# Patient Record
Sex: FEMALE | Race: WHITE | NOT HISPANIC OR LATINO | Employment: STUDENT | URBAN - NONMETROPOLITAN AREA
[De-identification: names, ages, dates, MRNs, and addresses within clinical notes are randomized per-mention and may not be internally consistent; named-entity substitution may affect disease eponyms.]

---

## 2019-04-19 ENCOUNTER — HOSPITAL ENCOUNTER (OUTPATIENT)
Dept: GENERAL RADIOLOGY | Facility: HOSPITAL | Age: 24
Discharge: HOME | End: 2019-04-19
Admitting: PEDIATRICS

## 2019-04-19 ENCOUNTER — TRANSCRIBE ORDERS (OUTPATIENT)
Dept: ADMINISTRATIVE | Facility: HOSPITAL | Age: 24
End: 2019-04-19

## 2019-04-19 DIAGNOSIS — M77.8 RIGHT SHOULDER TENDINITIS: Primary | ICD-10-CM

## 2019-04-19 DIAGNOSIS — M77.8 RIGHT SHOULDER TENDINITIS: ICD-10-CM

## 2019-04-19 PROCEDURE — 73030 X-RAY EXAM OF SHOULDER: CPT | Performed by: RADIOLOGY

## 2019-04-19 PROCEDURE — 73030 X-RAY EXAM OF SHOULDER: CPT

## 2020-02-11 ENCOUNTER — HOSPITAL ENCOUNTER (EMERGENCY)
Facility: HOSPITAL | Age: 25
Discharge: HOME | End: 2020-02-11
Attending: EMERGENCY MEDICINE | Admitting: EMERGENCY MEDICINE

## 2020-02-11 ENCOUNTER — APPOINTMENT (OUTPATIENT)
Dept: GENERAL RADIOLOGY | Facility: HOSPITAL | Age: 25
End: 2020-02-11

## 2020-02-11 VITALS
RESPIRATION RATE: 19 BRPM | HEART RATE: 67 BPM | DIASTOLIC BLOOD PRESSURE: 72 MMHG | SYSTOLIC BLOOD PRESSURE: 116 MMHG | OXYGEN SATURATION: 99 % | BODY MASS INDEX: 22.66 KG/M2 | TEMPERATURE: 97.5 F | WEIGHT: 120 LBS | HEIGHT: 61 IN

## 2020-02-11 DIAGNOSIS — R10.9 ABDOMINAL PAIN, UNSPECIFIED ABDOMINAL LOCATION: Primary | ICD-10-CM

## 2020-02-11 LAB
ALBUMIN SERPL-MCNC: 4.75 G/DL (ref 3.5–5.2)
ALBUMIN/GLOB SERPL: 1.8 G/DL
ALP SERPL-CCNC: 65 U/L (ref 39–117)
ALT SERPL W P-5'-P-CCNC: 15 U/L (ref 1–33)
ANION GAP SERPL CALCULATED.3IONS-SCNC: 12 MMOL/L (ref 5–15)
AST SERPL-CCNC: 19 U/L (ref 1–32)
B-HCG UR QL: NEGATIVE
BACTERIA UR QL AUTO: ABNORMAL /HPF
BASOPHILS # BLD AUTO: 0.06 10*3/MM3 (ref 0–0.2)
BASOPHILS NFR BLD AUTO: 0.8 % (ref 0–1.5)
BILIRUB SERPL-MCNC: 0.9 MG/DL (ref 0.2–1.2)
BILIRUB UR QL STRIP: NEGATIVE
BUN BLD-MCNC: 10 MG/DL (ref 6–20)
BUN/CREAT SERPL: 15.9 (ref 7–25)
CALCIUM SPEC-SCNC: 9.5 MG/DL (ref 8.6–10.5)
CHLORIDE SERPL-SCNC: 102 MMOL/L (ref 98–107)
CLARITY UR: ABNORMAL
CO2 SERPL-SCNC: 24 MMOL/L (ref 22–29)
COLOR UR: YELLOW
CREAT BLD-MCNC: 0.63 MG/DL (ref 0.57–1)
DEPRECATED RDW RBC AUTO: 40.6 FL (ref 37–54)
EOSINOPHIL # BLD AUTO: 0.13 10*3/MM3 (ref 0–0.4)
EOSINOPHIL NFR BLD AUTO: 1.7 % (ref 0.3–6.2)
ERYTHROCYTE [DISTWIDTH] IN BLOOD BY AUTOMATED COUNT: 12.2 % (ref 12.3–15.4)
FLUAV AG NPH QL: NEGATIVE
FLUBV AG NPH QL IA: NEGATIVE
GFR SERPL CREATININE-BSD FRML MDRD: 118 ML/MIN/1.73
GLOBULIN UR ELPH-MCNC: 2.7 GM/DL
GLUCOSE BLD-MCNC: 100 MG/DL (ref 65–99)
GLUCOSE UR STRIP-MCNC: NEGATIVE MG/DL
HCT VFR BLD AUTO: 43.6 % (ref 34–46.6)
HGB BLD-MCNC: 13.7 G/DL (ref 12–15.9)
HGB UR QL STRIP.AUTO: NEGATIVE
HYALINE CASTS UR QL AUTO: ABNORMAL /LPF
IMM GRANULOCYTES # BLD AUTO: 0.01 10*3/MM3 (ref 0–0.05)
IMM GRANULOCYTES NFR BLD AUTO: 0.1 % (ref 0–0.5)
KETONES UR QL STRIP: NEGATIVE
LEUKOCYTE ESTERASE UR QL STRIP.AUTO: ABNORMAL
LIPASE SERPL-CCNC: 22 U/L (ref 13–60)
LYMPHOCYTES # BLD AUTO: 2.63 10*3/MM3 (ref 0.7–3.1)
LYMPHOCYTES NFR BLD AUTO: 34.8 % (ref 19.6–45.3)
MCH RBC QN AUTO: 28.7 PG (ref 26.6–33)
MCHC RBC AUTO-ENTMCNC: 31.4 G/DL (ref 31.5–35.7)
MCV RBC AUTO: 91.2 FL (ref 79–97)
MONOCYTES # BLD AUTO: 0.57 10*3/MM3 (ref 0.1–0.9)
MONOCYTES NFR BLD AUTO: 7.5 % (ref 5–12)
NEUTROPHILS # BLD AUTO: 4.16 10*3/MM3 (ref 1.7–7)
NEUTROPHILS NFR BLD AUTO: 55.1 % (ref 42.7–76)
NITRITE UR QL STRIP: NEGATIVE
NRBC BLD AUTO-RTO: 0 /100 WBC (ref 0–0.2)
PH UR STRIP.AUTO: 8.5 [PH] (ref 5–8)
PLATELET # BLD AUTO: 302 10*3/MM3 (ref 140–450)
PMV BLD AUTO: 9.9 FL (ref 6–12)
POTASSIUM BLD-SCNC: 3.9 MMOL/L (ref 3.5–5.2)
PROT SERPL-MCNC: 7.4 G/DL (ref 6–8.5)
PROT UR QL STRIP: NEGATIVE
RBC # BLD AUTO: 4.78 10*6/MM3 (ref 3.77–5.28)
RBC # UR: ABNORMAL /HPF
REF LAB TEST METHOD: ABNORMAL
SODIUM BLD-SCNC: 138 MMOL/L (ref 136–145)
SP GR UR STRIP: >1.03 (ref 1–1.03)
SQUAMOUS #/AREA URNS HPF: ABNORMAL /HPF
UROBILINOGEN UR QL STRIP: ABNORMAL
WBC NRBC COR # BLD: 7.56 10*3/MM3 (ref 3.4–10.8)
WBC UR QL AUTO: ABNORMAL /HPF
YEAST URNS QL MICRO: ABNORMAL /HPF

## 2020-02-11 PROCEDURE — 96375 TX/PRO/DX INJ NEW DRUG ADDON: CPT

## 2020-02-11 PROCEDURE — 81025 URINE PREGNANCY TEST: CPT | Performed by: NURSE PRACTITIONER

## 2020-02-11 PROCEDURE — 81001 URINALYSIS AUTO W/SCOPE: CPT | Performed by: NURSE PRACTITIONER

## 2020-02-11 PROCEDURE — 87804 INFLUENZA ASSAY W/OPTIC: CPT | Performed by: NURSE PRACTITIONER

## 2020-02-11 PROCEDURE — 74022 RADEX COMPL AQT ABD SERIES: CPT | Performed by: RADIOLOGY

## 2020-02-11 PROCEDURE — 96361 HYDRATE IV INFUSION ADD-ON: CPT

## 2020-02-11 PROCEDURE — 99284 EMERGENCY DEPT VISIT MOD MDM: CPT

## 2020-02-11 PROCEDURE — 74022 RADEX COMPL AQT ABD SERIES: CPT

## 2020-02-11 PROCEDURE — 85025 COMPLETE CBC W/AUTO DIFF WBC: CPT | Performed by: NURSE PRACTITIONER

## 2020-02-11 PROCEDURE — 25010000002 ONDANSETRON PER 1 MG: Performed by: NURSE PRACTITIONER

## 2020-02-11 PROCEDURE — 96374 THER/PROPH/DIAG INJ IV PUSH: CPT

## 2020-02-11 PROCEDURE — 80053 COMPREHEN METABOLIC PANEL: CPT | Performed by: NURSE PRACTITIONER

## 2020-02-11 PROCEDURE — 25010000002 KETOROLAC TROMETHAMINE PER 15 MG: Performed by: NURSE PRACTITIONER

## 2020-02-11 PROCEDURE — 83690 ASSAY OF LIPASE: CPT | Performed by: NURSE PRACTITIONER

## 2020-02-11 RX ORDER — ONDANSETRON 4 MG/1
4 TABLET, ORALLY DISINTEGRATING ORAL 4 TIMES DAILY PRN
Qty: 12 TABLET | Refills: 0 | Status: SHIPPED | OUTPATIENT
Start: 2020-02-11

## 2020-02-11 RX ORDER — ONDANSETRON 2 MG/ML
4 INJECTION INTRAMUSCULAR; INTRAVENOUS ONCE
Status: COMPLETED | OUTPATIENT
Start: 2020-02-11 | End: 2020-02-11

## 2020-02-11 RX ORDER — KETOROLAC TROMETHAMINE 30 MG/ML
30 INJECTION, SOLUTION INTRAMUSCULAR; INTRAVENOUS ONCE
Status: COMPLETED | OUTPATIENT
Start: 2020-02-11 | End: 2020-02-11

## 2020-02-11 RX ORDER — KETOROLAC TROMETHAMINE 10 MG/1
10 TABLET, FILM COATED ORAL EVERY 6 HOURS PRN
Qty: 12 TABLET | Refills: 0 | Status: SHIPPED | OUTPATIENT
Start: 2020-02-11

## 2020-02-11 RX ADMIN — SODIUM CHLORIDE 1000 ML: 9 INJECTION, SOLUTION INTRAVENOUS at 14:06

## 2020-02-11 RX ADMIN — KETOROLAC TROMETHAMINE 30 MG: 30 INJECTION, SOLUTION INTRAMUSCULAR; INTRAVENOUS at 14:07

## 2020-02-11 RX ADMIN — ONDANSETRON 4 MG: 2 INJECTION INTRAMUSCULAR; INTRAVENOUS at 14:07

## 2020-02-11 NOTE — ED PROVIDER NOTES
Subjective     History provided by:  Patient   used: No    Abdominal Pain   Pain location:  Generalized  Pain quality: cramping    Pain radiates to:  Does not radiate  Pain severity:  Mild  Onset quality:  Gradual  Timing:  Constant  Progression:  Worsening  Chronicity:  New  Context: not alcohol use, not awakening from sleep, not diet changes, not eating, not laxative use, not medication withdrawal, not previous surgeries, not recent illness, not retching and not suspicious food intake    Relieved by:  Nothing  Worsened by:  Nothing  Ineffective treatments:  None tried  Associated symptoms: nausea    Associated symptoms: no anorexia, no belching, no chest pain, no chills, no constipation, no diarrhea, no dysuria, no fatigue, no fever, no hematemesis, no shortness of breath, no vaginal bleeding and no vaginal discharge        Review of Systems   Constitutional: Negative for chills, fatigue and fever.   Respiratory: Negative for shortness of breath.    Cardiovascular: Negative for chest pain.   Gastrointestinal: Positive for abdominal pain and nausea. Negative for anorexia, constipation, diarrhea and hematemesis.   Genitourinary: Negative for dysuria, vaginal bleeding and vaginal discharge.       No past medical history on file.    Allergies   Allergen Reactions   • Penicillins        No past surgical history on file.    No family history on file.    Social History     Socioeconomic History   • Marital status: Single     Spouse name: Not on file   • Number of children: Not on file   • Years of education: Not on file   • Highest education level: Not on file   Tobacco Use   • Smoking status: Never Smoker   Substance and Sexual Activity   • Alcohol use: No   • Drug use: No           Objective   Physical Exam   Constitutional: She is oriented to person, place, and time. She appears well-developed and well-nourished.   HENT:   Head: Normocephalic.   Right Ear: External ear normal.   Left Ear: External  ear normal.   Mouth/Throat: Oropharynx is clear and moist.   Eyes: Pupils are equal, round, and reactive to light. Conjunctivae and EOM are normal.   Neck: Normal range of motion. Neck supple.   Cardiovascular: Normal rate, regular rhythm, normal heart sounds and intact distal pulses.   Pulmonary/Chest: Effort normal and breath sounds normal.   Abdominal: Soft. Bowel sounds are normal.   Musculoskeletal: Normal range of motion.   Neurological: She is alert and oriented to person, place, and time.   Skin: Skin is warm and dry. Capillary refill takes less than 2 seconds.   Psychiatric: She has a normal mood and affect. Her behavior is normal. Thought content normal.   Nursing note and vitals reviewed.      Procedures           ED Course                                           MDM    Final diagnoses:   Abdominal pain, unspecified abdominal location            Rodger Melendez, APRN  02/11/20 2299

## 2021-01-19 ENCOUNTER — HOSPITAL ENCOUNTER (EMERGENCY)
Facility: HOSPITAL | Age: 26
Discharge: HOME | End: 2021-01-19
Attending: STUDENT IN AN ORGANIZED HEALTH CARE EDUCATION/TRAINING PROGRAM | Admitting: FAMILY MEDICINE

## 2021-01-19 VITALS
BODY MASS INDEX: 23.92 KG/M2 | HEIGHT: 62 IN | OXYGEN SATURATION: 100 % | DIASTOLIC BLOOD PRESSURE: 81 MMHG | RESPIRATION RATE: 18 BRPM | TEMPERATURE: 98.9 F | HEART RATE: 88 BPM | SYSTOLIC BLOOD PRESSURE: 131 MMHG | WEIGHT: 130 LBS

## 2021-01-19 DIAGNOSIS — U07.1 COVID-19: Primary | ICD-10-CM

## 2021-01-19 LAB
FLUAV RNA RESP QL NAA+PROBE: NOT DETECTED
FLUBV RNA RESP QL NAA+PROBE: NOT DETECTED
SARS-COV-2 RNA RESP QL NAA+PROBE: DETECTED

## 2021-01-19 PROCEDURE — 87636 SARSCOV2 & INF A&B AMP PRB: CPT | Performed by: NURSE PRACTITIONER

## 2021-01-19 PROCEDURE — C9803 HOPD COVID-19 SPEC COLLECT: HCPCS

## 2021-01-19 PROCEDURE — 99283 EMERGENCY DEPT VISIT LOW MDM: CPT

## 2021-01-19 RX ORDER — DEXAMETHASONE 6 MG/1
6 TABLET ORAL
Qty: 10 TABLET | Refills: 0 | Status: SHIPPED | OUTPATIENT
Start: 2021-01-19

## 2021-01-19 RX ORDER — BROMPHENIRAMINE MALEATE, PSEUDOEPHEDRINE HYDROCHLORIDE, AND DEXTROMETHORPHAN HYDROBROMIDE 2; 30; 10 MG/5ML; MG/5ML; MG/5ML
5-10 SYRUP ORAL 4 TIMES DAILY PRN
Qty: 118 ML | Refills: 0 | Status: SHIPPED | OUTPATIENT
Start: 2021-01-19

## 2021-01-20 ENCOUNTER — PATIENT OUTREACH (OUTPATIENT)
Dept: CASE MANAGEMENT | Facility: OTHER | Age: 26
End: 2021-01-20

## 2021-01-20 NOTE — ED PROVIDER NOTES
Subjective   Patient reports she has had positive covid exposure. States she shares a room with her sister who tested positive yesterday.       History provided by:  Patient   used: No    URI  Presenting symptoms: congestion and cough    Severity:  Mild  Onset quality:  Sudden  Duration:  1 day  Timing:  Constant  Progression:  Waxing and waning  Chronicity:  New  Relieved by:  Nothing  Worsened by:  Nothing  Ineffective treatments:  None tried  Associated symptoms: myalgias    Associated symptoms: no arthralgias and no headaches    Risk factors: sick contacts (see above)        Review of Systems   Constitutional: Negative.    HENT: Positive for congestion.    Eyes: Negative.    Respiratory: Positive for cough.    Cardiovascular: Negative.    Gastrointestinal: Negative.    Endocrine: Negative.    Genitourinary: Negative.    Musculoskeletal: Positive for myalgias. Negative for arthralgias.   Skin: Negative.    Allergic/Immunologic: Negative.    Neurological: Negative.  Negative for headaches.   Hematological: Negative.    Psychiatric/Behavioral: Negative.        No past medical history on file.    Allergies   Allergen Reactions   • Penicillins        No past surgical history on file.    No family history on file.    Social History     Socioeconomic History   • Marital status: Single     Spouse name: Not on file   • Number of children: Not on file   • Years of education: Not on file   • Highest education level: Not on file   Tobacco Use   • Smoking status: Never Smoker   Substance and Sexual Activity   • Alcohol use: No   • Drug use: No           Objective   Physical Exam  Vitals signs and nursing note reviewed.   Constitutional:       Appearance: She is well-developed.   HENT:      Head: Normocephalic.      Right Ear: External ear normal.      Left Ear: External ear normal.   Eyes:      Conjunctiva/sclera: Conjunctivae normal.      Pupils: Pupils are equal, round, and reactive to light.   Neck:       Musculoskeletal: Normal range of motion and neck supple.   Cardiovascular:      Rate and Rhythm: Normal rate and regular rhythm.      Heart sounds: Normal heart sounds.   Pulmonary:      Effort: Pulmonary effort is normal.      Breath sounds: Normal breath sounds.   Abdominal:      General: Bowel sounds are normal.      Palpations: Abdomen is soft.   Musculoskeletal: Normal range of motion.   Skin:     General: Skin is warm and dry.      Capillary Refill: Capillary refill takes less than 2 seconds.   Neurological:      Mental Status: She is alert and oriented to person, place, and time.   Psychiatric:         Behavior: Behavior normal.         Thought Content: Thought content normal.         Procedures           ED Course                                           MDM    Final diagnoses:   COVID-19            Rodger Melendez, APRN  01/19/21 3670

## 2021-01-20 NOTE — OUTREACH NOTE
ED Potential Covid Discharge Follow-up    Patient was seen in the ED yesterday and tested positive for Covid-19. Patient reports she feels a little better today but that it seems to have moved into her chest. ACM provided education on symptom management; encouraged patient to stay well hydrated and to lay prone often if possible to help move lung secretions forward, pt voiced understanding. Pt confirms she is self-quarantined; is aware of preventive measures to avoid spreading the virus. ACM instructed on 24/7 nurse line and Covid Hotline numbers, pt v/u.     Adela Neumann RN  Ambulatory     1/20/2021, 12:00 EST

## 2021-02-19 ENCOUNTER — HOSPITAL ENCOUNTER (EMERGENCY)
Facility: HOSPITAL | Age: 26
Discharge: HOME | End: 2021-02-19
Attending: EMERGENCY MEDICINE | Admitting: EMERGENCY MEDICINE

## 2021-02-19 ENCOUNTER — APPOINTMENT (OUTPATIENT)
Dept: CT IMAGING | Facility: HOSPITAL | Age: 26
End: 2021-02-19

## 2021-02-19 VITALS
RESPIRATION RATE: 18 BRPM | OXYGEN SATURATION: 98 % | BODY MASS INDEX: 18.95 KG/M2 | HEIGHT: 62 IN | DIASTOLIC BLOOD PRESSURE: 81 MMHG | HEART RATE: 86 BPM | WEIGHT: 103 LBS | TEMPERATURE: 98 F | SYSTOLIC BLOOD PRESSURE: 131 MMHG

## 2021-02-19 DIAGNOSIS — K56.41 FECAL IMPACTION (HCC): ICD-10-CM

## 2021-02-19 DIAGNOSIS — K59.00 CONSTIPATION, UNSPECIFIED CONSTIPATION TYPE: Primary | ICD-10-CM

## 2021-02-19 LAB
6-ACETYL MORPHINE: NEGATIVE
ALBUMIN SERPL-MCNC: 4.75 G/DL (ref 3.5–5.2)
ALBUMIN/GLOB SERPL: 1.7 G/DL
ALP SERPL-CCNC: 94 U/L (ref 39–117)
ALT SERPL W P-5'-P-CCNC: 24 U/L (ref 1–33)
AMPHET+METHAMPHET UR QL: NEGATIVE
ANION GAP SERPL CALCULATED.3IONS-SCNC: 11.1 MMOL/L (ref 5–15)
AST SERPL-CCNC: 21 U/L (ref 1–32)
BACTERIA UR QL AUTO: ABNORMAL /HPF
BARBITURATES UR QL SCN: NEGATIVE
BASOPHILS # BLD AUTO: 0.04 10*3/MM3 (ref 0–0.2)
BASOPHILS NFR BLD AUTO: 0.3 % (ref 0–1.5)
BENZODIAZ UR QL SCN: NEGATIVE
BILIRUB SERPL-MCNC: 0.5 MG/DL (ref 0–1.2)
BILIRUB UR QL STRIP: NEGATIVE
BUN SERPL-MCNC: 6 MG/DL (ref 6–20)
BUN/CREAT SERPL: 10.7 (ref 7–25)
BUPRENORPHINE SERPL-MCNC: NEGATIVE NG/ML
CALCIUM SPEC-SCNC: 9.6 MG/DL (ref 8.6–10.5)
CANNABINOIDS SERPL QL: NEGATIVE
CHLORIDE SERPL-SCNC: 107 MMOL/L (ref 98–107)
CLARITY UR: CLEAR
CO2 SERPL-SCNC: 23.9 MMOL/L (ref 22–29)
COCAINE UR QL: NEGATIVE
COLOR UR: YELLOW
CREAT SERPL-MCNC: 0.56 MG/DL (ref 0.57–1)
CRP SERPL-MCNC: <0.3 MG/DL (ref 0–0.5)
DEPRECATED RDW RBC AUTO: 41.8 FL (ref 37–54)
EOSINOPHIL # BLD AUTO: 0.06 10*3/MM3 (ref 0–0.4)
EOSINOPHIL NFR BLD AUTO: 0.5 % (ref 0.3–6.2)
ERYTHROCYTE [DISTWIDTH] IN BLOOD BY AUTOMATED COUNT: 13.1 % (ref 12.3–15.4)
ERYTHROCYTE [SEDIMENTATION RATE] IN BLOOD: 6 MM/HR (ref 0–20)
GFR SERPL CREATININE-BSD FRML MDRD: 134 ML/MIN/1.73
GLOBULIN UR ELPH-MCNC: 2.9 GM/DL
GLUCOSE SERPL-MCNC: 120 MG/DL (ref 65–99)
GLUCOSE UR STRIP-MCNC: NEGATIVE MG/DL
HCG SERPL QL: NEGATIVE
HCT VFR BLD AUTO: 43.9 % (ref 34–46.6)
HGB BLD-MCNC: 14.3 G/DL (ref 12–15.9)
HGB UR QL STRIP.AUTO: ABNORMAL
HOLD SPECIMEN: NORMAL
HYALINE CASTS UR QL AUTO: ABNORMAL /LPF
IMM GRANULOCYTES # BLD AUTO: 0.07 10*3/MM3 (ref 0–0.05)
IMM GRANULOCYTES NFR BLD AUTO: 0.6 % (ref 0–0.5)
KETONES UR QL STRIP: ABNORMAL
LEUKOCYTE ESTERASE UR QL STRIP.AUTO: NEGATIVE
LYMPHOCYTES # BLD AUTO: 2.01 10*3/MM3 (ref 0.7–3.1)
LYMPHOCYTES NFR BLD AUTO: 16.1 % (ref 19.6–45.3)
MCH RBC QN AUTO: 28.6 PG (ref 26.6–33)
MCHC RBC AUTO-ENTMCNC: 32.6 G/DL (ref 31.5–35.7)
MCV RBC AUTO: 87.8 FL (ref 79–97)
METHADONE UR QL SCN: NEGATIVE
MONOCYTES # BLD AUTO: 0.6 10*3/MM3 (ref 0.1–0.9)
MONOCYTES NFR BLD AUTO: 4.8 % (ref 5–12)
NEUTROPHILS NFR BLD AUTO: 77.7 % (ref 42.7–76)
NEUTROPHILS NFR BLD AUTO: 9.71 10*3/MM3 (ref 1.7–7)
NITRITE UR QL STRIP: NEGATIVE
NRBC BLD AUTO-RTO: 0 /100 WBC (ref 0–0.2)
OPIATES UR QL: NEGATIVE
OXYCODONE UR QL SCN: NEGATIVE
PCP UR QL SCN: NEGATIVE
PH UR STRIP.AUTO: <=5 [PH] (ref 5–8)
PLATELET # BLD AUTO: 346 10*3/MM3 (ref 140–450)
PMV BLD AUTO: 9.4 FL (ref 6–12)
POTASSIUM SERPL-SCNC: 4.2 MMOL/L (ref 3.5–5.2)
PROT SERPL-MCNC: 7.6 G/DL (ref 6–8.5)
PROT UR QL STRIP: NEGATIVE
RBC # BLD AUTO: 5 10*6/MM3 (ref 3.77–5.28)
RBC # UR: ABNORMAL /HPF
REF LAB TEST METHOD: ABNORMAL
SODIUM SERPL-SCNC: 142 MMOL/L (ref 136–145)
SP GR UR STRIP: <=1.005 (ref 1–1.03)
SQUAMOUS #/AREA URNS HPF: ABNORMAL /HPF
UROBILINOGEN UR QL STRIP: ABNORMAL
WBC # BLD AUTO: 12.49 10*3/MM3 (ref 3.4–10.8)
WBC UR QL AUTO: ABNORMAL /HPF
WHOLE BLOOD HOLD SPECIMEN: NORMAL
WHOLE BLOOD HOLD SPECIMEN: NORMAL

## 2021-02-19 PROCEDURE — 80307 DRUG TEST PRSMV CHEM ANLYZR: CPT | Performed by: EMERGENCY MEDICINE

## 2021-02-19 PROCEDURE — 80053 COMPREHEN METABOLIC PANEL: CPT | Performed by: EMERGENCY MEDICINE

## 2021-02-19 PROCEDURE — 99284 EMERGENCY DEPT VISIT MOD MDM: CPT

## 2021-02-19 PROCEDURE — 74176 CT ABD & PELVIS W/O CONTRAST: CPT

## 2021-02-19 PROCEDURE — 81001 URINALYSIS AUTO W/SCOPE: CPT | Performed by: EMERGENCY MEDICINE

## 2021-02-19 PROCEDURE — 99285 EMERGENCY DEPT VISIT HI MDM: CPT

## 2021-02-19 PROCEDURE — 74176 CT ABD & PELVIS W/O CONTRAST: CPT | Performed by: RADIOLOGY

## 2021-02-19 PROCEDURE — 85652 RBC SED RATE AUTOMATED: CPT | Performed by: EMERGENCY MEDICINE

## 2021-02-19 PROCEDURE — 84703 CHORIONIC GONADOTROPIN ASSAY: CPT | Performed by: EMERGENCY MEDICINE

## 2021-02-19 PROCEDURE — 86140 C-REACTIVE PROTEIN: CPT | Performed by: EMERGENCY MEDICINE

## 2021-02-19 PROCEDURE — 85025 COMPLETE CBC W/AUTO DIFF WBC: CPT | Performed by: EMERGENCY MEDICINE

## 2021-02-19 RX ORDER — MAGNESIUM CARB/ALUMINUM HYDROX 105-160MG
150 TABLET,CHEWABLE ORAL ONCE
Status: COMPLETED | OUTPATIENT
Start: 2021-02-19 | End: 2021-02-19

## 2021-02-19 RX ORDER — POLYETHYLENE GLYCOL 3350 17 G/17G
17 POWDER, FOR SOLUTION ORAL DAILY
Qty: 20 EACH | Refills: 0 | Status: SHIPPED | OUTPATIENT
Start: 2021-02-19

## 2021-02-19 RX ORDER — SODIUM CHLORIDE 0.9 % (FLUSH) 0.9 %
10 SYRINGE (ML) INJECTION AS NEEDED
Status: DISCONTINUED | OUTPATIENT
Start: 2021-02-19 | End: 2021-02-19 | Stop reason: HOSPADM

## 2021-02-19 RX ADMIN — CITROMA MAGNESIUM CITRATE 150 ML: 1.75 LIQUID ORAL at 05:02

## 2021-02-19 RX ADMIN — SODIUM CHLORIDE 1000 ML: 9 INJECTION, SOLUTION INTRAVENOUS at 06:39

## 2021-02-19 RX ADMIN — GLYCERIN 1 G: 1 SUPPOSITORY RECTAL at 05:03

## 2021-02-19 NOTE — ED NOTES
Discharge instructions reviewed with patient, patient instructed to return to ED if symptoms worsen or if any new problems arise. Patient verbalizes understanding of discharge instructions, patient ambulatory out of ED. No acute distress noted.     Bridget Hogue RN  02/19/21 1126

## 2021-02-19 NOTE — DISCHARGE INSTRUCTIONS
Home in care of father.  Rest.  Drink plenty of water.  Take your medications as prescribed.  Once this immediate problem has resolved please use Metamucil daily, as directed.  See your family doctor in the office on Monday for recheck.  Return to the emergency department right away if symptoms worsen/any problems.

## 2021-02-19 NOTE — ED PROVIDER NOTES
Subjective     History provided by:  Patient   used: No    Abdominal Pain  Pain location:  Generalized  Pain quality: aching, cramping and dull    Pain radiates to:  Does not radiate  Pain severity:  Mild  Onset quality:  Gradual  Timing:  Sporadic  Progression:  Worsening  Chronicity:  New  Context: not alcohol use, not awakening from sleep, not diet changes, not eating, not laxative use, not medication withdrawal, not previous surgeries, not recent illness, not recent sexual activity, not recent travel, not retching, not sick contacts, not suspicious food intake and not trauma    Relieved by:  Nothing  Worsened by:  Nothing  Ineffective treatments:  None tried  Associated symptoms: constipation    Associated symptoms: no anorexia, no belching, no chest pain, no chills, no cough, no diarrhea, no dysuria, no fatigue, no fever, no flatus, no hematemesis, no hematochezia, no hematuria, no melena, no nausea, no shortness of breath, no sore throat and no vomiting    Risk factors: no alcohol abuse, no aspirin use, not elderly, has not had multiple surgeries, no NSAID use, not obese, not pregnant and no recent hospitalization        Review of Systems   Constitutional: Negative for activity change, appetite change, chills, diaphoresis, fatigue and fever.   HENT: Negative for congestion, ear pain and sore throat.    Eyes: Negative for redness.   Respiratory: Negative for cough, chest tightness, shortness of breath and wheezing.    Cardiovascular: Negative for chest pain, palpitations and leg swelling.   Gastrointestinal: Positive for abdominal pain and constipation. Negative for anorexia, diarrhea, flatus, hematemesis, hematochezia, melena, nausea and vomiting.   Genitourinary: Negative for dysuria, hematuria and urgency.   Musculoskeletal: Negative for arthralgias, back pain, myalgias and neck pain.   Skin: Negative for pallor, rash and wound.   Neurological: Negative for dizziness, speech difficulty,  weakness and headaches.   Psychiatric/Behavioral: Negative for agitation, behavioral problems, confusion and decreased concentration.   All other systems reviewed and are negative.      History reviewed. No pertinent past medical history.    Allergies   Allergen Reactions   • Penicillins        History reviewed. No pertinent surgical history.    History reviewed. No pertinent family history.    Social History     Socioeconomic History   • Marital status: Single     Spouse name: Not on file   • Number of children: Not on file   • Years of education: Not on file   • Highest education level: Not on file   Tobacco Use   • Smoking status: Never Smoker   Substance and Sexual Activity   • Alcohol use: No   • Drug use: No           Objective   Physical Exam  Vitals signs and nursing note reviewed.   Constitutional:       General: She is not in acute distress.     Appearance: Normal appearance. She is well-developed. She is not toxic-appearing or diaphoretic.   HENT:      Head: Normocephalic and atraumatic.      Right Ear: External ear normal.      Left Ear: External ear normal.      Nose: Nose normal.      Mouth/Throat:      Pharynx: No oropharyngeal exudate.      Tonsils: No tonsillar exudate.   Eyes:      General: Lids are normal.      Conjunctiva/sclera: Conjunctivae normal.      Pupils: Pupils are equal, round, and reactive to light.   Neck:      Musculoskeletal: Full passive range of motion without pain, normal range of motion and neck supple.      Thyroid: No thyromegaly.   Cardiovascular:      Rate and Rhythm: Normal rate and regular rhythm.      Pulses: Normal pulses.      Heart sounds: Normal heart sounds, S1 normal and S2 normal.   Pulmonary:      Effort: Pulmonary effort is normal. No tachypnea or respiratory distress.      Breath sounds: Normal breath sounds. No decreased breath sounds, wheezing or rales.   Chest:      Chest wall: No tenderness.   Abdominal:      General: Bowel sounds are normal. There is no  distension.      Palpations: Abdomen is soft.      Tenderness: There is abdominal tenderness. There is no guarding or rebound.   Musculoskeletal: Normal range of motion.         General: No tenderness or deformity.   Lymphadenopathy:      Cervical: No cervical adenopathy.   Skin:     General: Skin is warm and dry.      Coloration: Skin is not pale.      Findings: No erythema or rash.   Neurological:      Mental Status: She is alert and oriented to person, place, and time.      GCS: GCS eye subscore is 4. GCS verbal subscore is 5. GCS motor subscore is 6.      Cranial Nerves: No cranial nerve deficit.      Sensory: No sensory deficit.   Psychiatric:         Speech: Speech normal.         Behavior: Behavior normal.         Thought Content: Thought content normal.         Judgment: Judgment normal.         Procedures  CT Abdomen Pelvis Without Contrast   Final Result   1.  Enteritis bowel gas pattern.    2.  Rectal fecal impaction is noted with moderate to large volume stool   in the rectosigmoid colon.   3.  Otherwise unremarkable exam.       This report was finalized on 2/19/2021 8:21 AM by Dr. Billy Madison MD.            Results for orders placed or performed during the hospital encounter of 02/19/21   Comprehensive Metabolic Panel    Specimen: Arm, Left; Blood   Result Value Ref Range    Glucose 120 (H) 65 - 99 mg/dL    BUN 6 6 - 20 mg/dL    Creatinine 0.56 (L) 0.57 - 1.00 mg/dL    Sodium 142 136 - 145 mmol/L    Potassium 4.2 3.5 - 5.2 mmol/L    Chloride 107 98 - 107 mmol/L    CO2 23.9 22.0 - 29.0 mmol/L    Calcium 9.6 8.6 - 10.5 mg/dL    Total Protein 7.6 6.0 - 8.5 g/dL    Albumin 4.75 3.50 - 5.20 g/dL    ALT (SGPT) 24 1 - 33 U/L    AST (SGOT) 21 1 - 32 U/L    Alkaline Phosphatase 94 39 - 117 U/L    Total Bilirubin 0.5 0.0 - 1.2 mg/dL    eGFR Non African Amer 134 >60 mL/min/1.73    Globulin 2.9 gm/dL    A/G Ratio 1.7 g/dL    BUN/Creatinine Ratio 10.7 7.0 - 25.0    Anion Gap 11.1 5.0 - 15.0 mmol/L   Urinalysis  With Culture If Indicated - Urine, Clean Catch    Specimen: Urine, Clean Catch   Result Value Ref Range    Color, UA Yellow Yellow, Straw    Appearance, UA Clear Clear    pH, UA <=5.0 5.0 - 8.0    Specific Gravity, UA <=1.005 1.005 - 1.030    Glucose, UA Negative Negative    Ketones, UA Trace (A) Negative    Bilirubin, UA Negative Negative    Blood, UA Large (3+) (A) Negative    Protein, UA Negative Negative    Leuk Esterase, UA Negative Negative    Nitrite, UA Negative Negative    Urobilinogen, UA 0.2 E.U./dL 0.2 - 1.0 E.U./dL   Sedimentation Rate    Specimen: Arm, Left; Blood   Result Value Ref Range    Sed Rate 6 0 - 20 mm/hr   C-reactive Protein    Specimen: Arm, Left; Blood   Result Value Ref Range    C-Reactive Protein <0.30 0.00 - 0.50 mg/dL   hCG, Serum, Qualitative    Specimen: Arm, Left; Blood   Result Value Ref Range    HCG Qualitative Negative Negative   CBC Auto Differential    Specimen: Arm, Left; Blood   Result Value Ref Range    WBC 12.49 (H) 3.40 - 10.80 10*3/mm3    RBC 5.00 3.77 - 5.28 10*6/mm3    Hemoglobin 14.3 12.0 - 15.9 g/dL    Hematocrit 43.9 34.0 - 46.6 %    MCV 87.8 79.0 - 97.0 fL    MCH 28.6 26.6 - 33.0 pg    MCHC 32.6 31.5 - 35.7 g/dL    RDW 13.1 12.3 - 15.4 %    RDW-SD 41.8 37.0 - 54.0 fl    MPV 9.4 6.0 - 12.0 fL    Platelets 346 140 - 450 10*3/mm3    Neutrophil % 77.7 (H) 42.7 - 76.0 %    Lymphocyte % 16.1 (L) 19.6 - 45.3 %    Monocyte % 4.8 (L) 5.0 - 12.0 %    Eosinophil % 0.5 0.3 - 6.2 %    Basophil % 0.3 0.0 - 1.5 %    Immature Grans % 0.6 (H) 0.0 - 0.5 %    Neutrophils, Absolute 9.71 (H) 1.70 - 7.00 10*3/mm3    Lymphocytes, Absolute 2.01 0.70 - 3.10 10*3/mm3    Monocytes, Absolute 0.60 0.10 - 0.90 10*3/mm3    Eosinophils, Absolute 0.06 0.00 - 0.40 10*3/mm3    Basophils, Absolute 0.04 0.00 - 0.20 10*3/mm3    Immature Grans, Absolute 0.07 (H) 0.00 - 0.05 10*3/mm3    nRBC 0.0 0.0 - 0.2 /100 WBC   Urinalysis, Microscopic Only - Urine, Clean Catch    Specimen: Urine, Clean Catch   Result  Value Ref Range    RBC, UA 13-20 (A) None Seen, 0-2 /HPF    WBC, UA 0-2 None Seen, 0-2 /HPF    Bacteria, UA Trace (A) None Seen /HPF    Squamous Epithelial Cells, UA 0-2 None Seen, 0-2 /HPF    Hyaline Casts, UA None Seen None Seen /LPF    Methodology Manual Light Microscopy    Urine Drug Screen - Urine, Clean Catch    Specimen: Urine, Clean Catch   Result Value Ref Range    Amphetamine Screen, Urine Negative Negative    Barbiturates Screen, Urine Negative Negative    Benzodiazepine Screen, Urine Negative Negative    Cocaine Screen, Urine Negative Negative    Methadone Screen, Urine Negative Negative    Opiate Screen Negative Negative    Phencyclidine (PCP), Urine Negative Negative    THC, Screen, Urine Negative Negative    6-ACETYL MORPHINE Negative Negative    Buprenorphine, Screen, Urine Negative Negative    Oxycodone Screen, Urine Negative Negative   Light Blue Top   Result Value Ref Range    Extra Tube hold for add-on    Green Top (Gel)   Result Value Ref Range    Extra Tube Hold for add-ons.    Lavender Top   Result Value Ref Range    Extra Tube hold for add-on                 ED Course  ED Course as of Feb 19 1108 Fri Feb 19, 2021   1102 I assumed patient's care from Dr. Mas this morning at shift change.  Please see his documentation above.  Patient's emergency department stay has been uneventful.  Nursing did a manual disimpaction followed by soapsuds enema with very good results.  Patient reports that she feels much better and wants to go home.  She is discharged home in care of her father.  We discussed her test results and her plan of care.  She voices understanding and agreement.    [CM]      ED Course User Index  [CM] Medardo Moore MD                                           MDM  Number of Diagnoses or Management Options     Amount and/or Complexity of Data Reviewed  Review and summarize past medical records: yes  Independent visualization of images, tracings, or specimens: yes    Risk  of Complications, Morbidity, and/or Mortality  Presenting problems: moderate  Diagnostic procedures: moderate  Management options: moderate    Patient Progress  Patient progress: stable      Final diagnoses:   Constipation, unspecified constipation type   Fecal impaction (CMS/[facility])             Please note that portions of this note were completed with a voice recognition program.        Medardo Moore MD  02/19/21 7803

## 2021-02-19 NOTE — ED NOTES
"In room at this time per family request. Patient family states \"she tried to go to the bathroom, it hurt so bad she couldn't go.\" Provider made aware      Tony, Jan, RN  02/19/21 0544    "

## 2021-06-04 ENCOUNTER — HOSPITAL ENCOUNTER (EMERGENCY)
Facility: HOSPITAL | Age: 26
Discharge: HOME | End: 2021-06-04
Attending: FAMILY MEDICINE | Admitting: EMERGENCY MEDICINE

## 2021-06-04 ENCOUNTER — APPOINTMENT (OUTPATIENT)
Dept: GENERAL RADIOLOGY | Facility: HOSPITAL | Age: 26
End: 2021-06-04

## 2021-06-04 VITALS
BODY MASS INDEX: 24.73 KG/M2 | SYSTOLIC BLOOD PRESSURE: 121 MMHG | DIASTOLIC BLOOD PRESSURE: 82 MMHG | HEART RATE: 95 BPM | RESPIRATION RATE: 18 BRPM | TEMPERATURE: 98 F | OXYGEN SATURATION: 91 % | WEIGHT: 131 LBS | HEIGHT: 61 IN

## 2021-06-04 DIAGNOSIS — K59.00 CONSTIPATION, UNSPECIFIED CONSTIPATION TYPE: Primary | ICD-10-CM

## 2021-06-04 LAB
ALBUMIN SERPL-MCNC: 4.58 G/DL (ref 3.5–5.2)
ALBUMIN/GLOB SERPL: 1.6 G/DL
ALP SERPL-CCNC: 75 U/L (ref 39–117)
ALT SERPL W P-5'-P-CCNC: 14 U/L (ref 1–33)
ANION GAP SERPL CALCULATED.3IONS-SCNC: 10.5 MMOL/L (ref 5–15)
AST SERPL-CCNC: 18 U/L (ref 1–32)
B-HCG UR QL: NEGATIVE
BACTERIA UR QL AUTO: ABNORMAL /HPF
BASOPHILS # BLD AUTO: 0.07 10*3/MM3 (ref 0–0.2)
BASOPHILS NFR BLD AUTO: 0.7 % (ref 0–1.5)
BILIRUB SERPL-MCNC: 0.2 MG/DL (ref 0–1.2)
BILIRUB UR QL STRIP: NEGATIVE
BUN SERPL-MCNC: 11 MG/DL (ref 6–20)
BUN/CREAT SERPL: 13.6 (ref 7–25)
CALCIUM SPEC-SCNC: 9.6 MG/DL (ref 8.6–10.5)
CHLORIDE SERPL-SCNC: 105 MMOL/L (ref 98–107)
CLARITY UR: CLEAR
CO2 SERPL-SCNC: 22.5 MMOL/L (ref 22–29)
COLOR UR: YELLOW
CREAT SERPL-MCNC: 0.81 MG/DL (ref 0.57–1)
DEPRECATED RDW RBC AUTO: 39.1 FL (ref 37–54)
EOSINOPHIL # BLD AUTO: 0.33 10*3/MM3 (ref 0–0.4)
EOSINOPHIL NFR BLD AUTO: 3.3 % (ref 0.3–6.2)
ERYTHROCYTE [DISTWIDTH] IN BLOOD BY AUTOMATED COUNT: 12.1 % (ref 12.3–15.4)
GFR SERPL CREATININE-BSD FRML MDRD: 88 ML/MIN/1.73
GLOBULIN UR ELPH-MCNC: 2.8 GM/DL
GLUCOSE SERPL-MCNC: 96 MG/DL (ref 65–99)
GLUCOSE UR STRIP-MCNC: NEGATIVE MG/DL
HCT VFR BLD AUTO: 41.2 % (ref 34–46.6)
HGB BLD-MCNC: 13.4 G/DL (ref 12–15.9)
HGB UR QL STRIP.AUTO: ABNORMAL
HOLD SPECIMEN: NORMAL
HOLD SPECIMEN: NORMAL
HYALINE CASTS UR QL AUTO: ABNORMAL /LPF
IMM GRANULOCYTES # BLD AUTO: 0.04 10*3/MM3 (ref 0–0.05)
IMM GRANULOCYTES NFR BLD AUTO: 0.4 % (ref 0–0.5)
KETONES UR QL STRIP: NEGATIVE
LEUKOCYTE ESTERASE UR QL STRIP.AUTO: ABNORMAL
LIPASE SERPL-CCNC: 34 U/L (ref 13–60)
LYMPHOCYTES # BLD AUTO: 1.27 10*3/MM3 (ref 0.7–3.1)
LYMPHOCYTES NFR BLD AUTO: 12.9 % (ref 19.6–45.3)
MCH RBC QN AUTO: 28.5 PG (ref 26.6–33)
MCHC RBC AUTO-ENTMCNC: 32.5 G/DL (ref 31.5–35.7)
MCV RBC AUTO: 87.5 FL (ref 79–97)
MONOCYTES # BLD AUTO: 0.74 10*3/MM3 (ref 0.1–0.9)
MONOCYTES NFR BLD AUTO: 7.5 % (ref 5–12)
NEUTROPHILS NFR BLD AUTO: 7.43 10*3/MM3 (ref 1.7–7)
NEUTROPHILS NFR BLD AUTO: 75.2 % (ref 42.7–76)
NITRITE UR QL STRIP: NEGATIVE
NRBC BLD AUTO-RTO: 0 /100 WBC (ref 0–0.2)
PH UR STRIP.AUTO: 6 [PH] (ref 5–8)
PLATELET # BLD AUTO: 357 10*3/MM3 (ref 140–450)
PMV BLD AUTO: 9.3 FL (ref 6–12)
POTASSIUM SERPL-SCNC: 4.4 MMOL/L (ref 3.5–5.2)
PROT SERPL-MCNC: 7.4 G/DL (ref 6–8.5)
PROT UR QL STRIP: NEGATIVE
RBC # BLD AUTO: 4.71 10*6/MM3 (ref 3.77–5.28)
RBC # UR: ABNORMAL /HPF
REF LAB TEST METHOD: ABNORMAL
SODIUM SERPL-SCNC: 138 MMOL/L (ref 136–145)
SP GR UR STRIP: 1.02 (ref 1–1.03)
SQUAMOUS #/AREA URNS HPF: ABNORMAL /HPF
UROBILINOGEN UR QL STRIP: ABNORMAL
WBC # BLD AUTO: 9.88 10*3/MM3 (ref 3.4–10.8)
WBC UR QL AUTO: ABNORMAL /HPF
WHOLE BLOOD HOLD SPECIMEN: NORMAL
WHOLE BLOOD HOLD SPECIMEN: NORMAL

## 2021-06-04 PROCEDURE — 81001 URINALYSIS AUTO W/SCOPE: CPT | Performed by: PHYSICIAN ASSISTANT

## 2021-06-04 PROCEDURE — 85025 COMPLETE CBC W/AUTO DIFF WBC: CPT | Performed by: PHYSICIAN ASSISTANT

## 2021-06-04 PROCEDURE — 83690 ASSAY OF LIPASE: CPT | Performed by: PHYSICIAN ASSISTANT

## 2021-06-04 PROCEDURE — 74018 RADEX ABDOMEN 1 VIEW: CPT | Performed by: RADIOLOGY

## 2021-06-04 PROCEDURE — 81025 URINE PREGNANCY TEST: CPT | Performed by: PHYSICIAN ASSISTANT

## 2021-06-04 PROCEDURE — 80053 COMPREHEN METABOLIC PANEL: CPT | Performed by: PHYSICIAN ASSISTANT

## 2021-06-04 PROCEDURE — 74018 RADEX ABDOMEN 1 VIEW: CPT

## 2021-06-04 PROCEDURE — 99283 EMERGENCY DEPT VISIT LOW MDM: CPT

## 2021-06-04 RX ORDER — SODIUM CHLORIDE 0.9 % (FLUSH) 0.9 %
10 SYRINGE (ML) INJECTION AS NEEDED
Status: DISCONTINUED | OUTPATIENT
Start: 2021-06-04 | End: 2021-06-04 | Stop reason: HOSPADM

## 2021-06-04 RX ORDER — MAGNESIUM CARB/ALUMINUM HYDROX 105-160MG
296 TABLET,CHEWABLE ORAL ONCE
Status: COMPLETED | OUTPATIENT
Start: 2021-06-04 | End: 2021-06-04

## 2021-06-04 RX ADMIN — CITROMA MAGNESIUM CITRATE 296 ML: 1.75 LIQUID ORAL at 21:12

## 2021-06-05 NOTE — ED PROVIDER NOTES
Subjective   26 yo female patient presents with constipation. Patient states that she was started on narcotics and stool softeners after having surgery. Patient states that she had a small hard BM this morning but has not been able to go completely.  Pt states she is able to pass gas. No fevers. No vomiting.       History provided by:  Patient   used: No    Constipation  Severity:  Mild  Time since last bowel movement:  5 days  Timing:  Constant  Progression:  Worsening  Chronicity:  New  Context: narcotics    Stool description:  None produced  Relieved by:  Nothing  Worsened by:  Nothing  Ineffective treatments:  Stool softeners  Associated symptoms: abdominal pain and flatus    Associated symptoms: no anorexia, no back pain, no diarrhea, no dysuria, no fever, no hematochezia, no nausea, no urinary retention and no vomiting        Review of Systems   Constitutional: Negative.  Negative for fever.   HENT: Negative.    Eyes: Negative.    Respiratory: Negative.    Cardiovascular: Negative.    Gastrointestinal: Positive for abdominal pain, constipation and flatus. Negative for anorexia, diarrhea, hematochezia, nausea and vomiting.   Endocrine: Negative.    Genitourinary: Negative.  Negative for dysuria.   Musculoskeletal: Negative.  Negative for back pain.   Skin: Negative.    Allergic/Immunologic: Negative.    Neurological: Negative.    Hematological: Negative.    Psychiatric/Behavioral: Negative.    All other systems reviewed and are negative.      No past medical history on file.    Allergies   Allergen Reactions   • Penicillins        No past surgical history on file.    No family history on file.    Social History     Socioeconomic History   • Marital status: Single     Spouse name: Not on file   • Number of children: Not on file   • Years of education: Not on file   • Highest education level: Not on file   Tobacco Use   • Smoking status: Never Smoker   Substance and Sexual Activity   • Alcohol  use: No   • Drug use: No           Objective   Physical Exam  Vitals and nursing note reviewed.   Constitutional:       General: She is not in acute distress.     Appearance: Normal appearance. She is not ill-appearing, toxic-appearing or diaphoretic.   HENT:      Head: Normocephalic and atraumatic.      Right Ear: External ear normal.      Left Ear: External ear normal.      Nose: Nose normal.      Mouth/Throat:      Mouth: Mucous membranes are moist.      Pharynx: Oropharynx is clear.   Eyes:      Extraocular Movements: Extraocular movements intact.      Conjunctiva/sclera: Conjunctivae normal.      Pupils: Pupils are equal, round, and reactive to light.   Cardiovascular:      Rate and Rhythm: Normal rate and regular rhythm.      Pulses: Normal pulses.      Heart sounds: Normal heart sounds.   Pulmonary:      Effort: Pulmonary effort is normal.      Breath sounds: Normal breath sounds.   Abdominal:      General: Abdomen is flat. Bowel sounds are normal. There is no distension.      Palpations: Abdomen is soft. There is no mass.      Tenderness: There is no abdominal tenderness. There is no right CVA tenderness, left CVA tenderness, guarding or rebound.      Hernia: No hernia is present.   Musculoskeletal:         General: Normal range of motion.      Cervical back: Normal range of motion and neck supple.   Skin:     General: Skin is warm and dry.      Capillary Refill: Capillary refill takes less than 2 seconds.   Neurological:      General: No focal deficit present.      Mental Status: She is alert. Mental status is at baseline.   Psychiatric:         Mood and Affect: Mood normal.         Behavior: Behavior normal.         Thought Content: Thought content normal.         Judgment: Judgment normal.         Procedures           ED Course  ED Course as of Jun 04 2105 Fri Jun 04, 2021 2053 IMPRESSION:    Moderate stool right colon suggestive of constipation.     This report was finalized on 6/4/2021 8:50 PM by  Dr. Billy Madison MD.   XR Abdomen KUB [ML]      ED Course User Index  [ML] Shea Lisa PA                                           MDM  Number of Diagnoses or Management Options     Amount and/or Complexity of Data Reviewed  Clinical lab tests: ordered and reviewed  Tests in the radiology section of CPT®: ordered and reviewed    Risk of Complications, Morbidity, and/or Mortality  Presenting problems: low  Diagnostic procedures: low  Management options: low    Patient Progress  Patient progress: improved      Final diagnoses:   Constipation, unspecified constipation type       ED Disposition  ED Disposition     ED Disposition Condition Comment    Discharge Stable           Yoli Ferro, APRN  [address] [facility] [address]  [phone]    Schedule an appointment as soon as possible for a visit in 1 day           Medication List      No changes were made to your prescriptions during this visit.          Shea Lisa PA  06/04/21 8757